# Patient Record
Sex: FEMALE | Race: OTHER | Employment: UNEMPLOYED | URBAN - METROPOLITAN AREA
[De-identification: names, ages, dates, MRNs, and addresses within clinical notes are randomized per-mention and may not be internally consistent; named-entity substitution may affect disease eponyms.]

---

## 2021-02-22 ENCOUNTER — VIRTUAL VISIT (OUTPATIENT)
Dept: FAMILY MEDICINE CLINIC | Age: 66
End: 2021-02-22

## 2021-02-22 ENCOUNTER — TELEPHONE (OUTPATIENT)
Dept: FAMILY MEDICINE CLINIC | Age: 66
End: 2021-02-22

## 2021-02-22 DIAGNOSIS — I10 ESSENTIAL HYPERTENSION: ICD-10-CM

## 2021-02-22 DIAGNOSIS — R07.89 CHEST WALL PAIN: Primary | ICD-10-CM

## 2021-02-22 DIAGNOSIS — E11.9 CONTROLLED TYPE 2 DIABETES MELLITUS WITHOUT COMPLICATION, WITHOUT LONG-TERM CURRENT USE OF INSULIN (HCC): ICD-10-CM

## 2021-02-22 PROCEDURE — 99441 PR PHYS/QHP TELEPHONE EVALUATION 5-10 MIN: CPT | Performed by: FAMILY MEDICINE

## 2021-02-22 RX ORDER — IRBESARTAN 300 MG/1
300 TABLET ORAL
COMMUNITY

## 2021-02-22 RX ORDER — METFORMIN HYDROCHLORIDE 850 MG/1
850 TABLET ORAL DAILY
COMMUNITY

## 2021-02-22 RX ORDER — HYDROCHLOROTHIAZIDE 25 MG/1
25 TABLET ORAL DAILY
COMMUNITY

## 2021-02-22 NOTE — PROGRESS NOTES
Minor Side (: 1955) is a 63 y.o. female, new patient, here for evaluation of the following chief complaint(s):   Fall and Diabetes (F/U)       ASSESSMENT/PLAN:  1. Chest wall pain  -     XR RIBS RT W PA CXR MIN 3 V; Future  Will get xray due to concern with rib fracture. Discussed conservative tx. 2. Controlled type 2 diabetes mellitus without complication, without long-term current use of insulin (HCC)  -     CBC W/O DIFF; Future  -     HEMOGLOBIN A1C WITH EAG; Future  -     LIPID PANEL; Future  -     METABOLIC PANEL, COMPREHENSIVE; Future  -     TSH 3RD GENERATION; Future  -     MICROALBUMIN, UR, RAND W/ MICROALB/CREAT RATIO; Future  Continue with current regimen and check baseline labs. 3. Essential hypertension  Continue with current medications and will have patient seen in office for BP check. Return in about 1 week (around 3/1/2021) for follow up for F2F in 1-2 weeks for check up. SUBJECTIVE/OBJECTIVE:  HPI  Fall:  Idelle Sails in shower. Slipped and fell hitting the Rt breast and rib. Now having pain with breathing that radiates back to Rt scapula. DM:  Patient is taking Glucophage regularly without any side effects. Last check was over 6 months ago. Does not check BS at home. HTN:  Patient is taking Avapro, HCTZ regularly without any side effects. Does not check BP at home. Review of Systems   Constitutional: Negative for chills and fever. Respiratory: Negative for cough and shortness of breath. No flowsheet data found. Physical Exam    On this date 21 I have spent 7 minutes reviewing previous notes, test results and face to face (virtual) with the patient discussing the diagnosis and importance of compliance with the treatment plan as well as documenting on the day of the visit. Minor Side is being evaluated by a Virtual Visit (phone visit) encounter to address concerns as mentioned above. A caregiver was present when appropriate.  Due to this being a TeleHealth encounter (During [facility] public health emergency), evaluation of the following organ systems was limited: Vitals/Constitutional/EENT/Resp/CV/GI//MS/Neuro/Skin/Heme-Lymph-Imm. Pursuant to the emergency declaration under the [address] [place] [address], [address] [place] [address] authority and the [employer] and [employer] Act, this Virtual Visit was conducted with patient's (and/or legal guardian's) consent, to reduce the patient's risk of exposure to COVID-19 and provide necessary medical care. The patient (and/or legal guardian) has also been advised to contact this office for worsening conditions or problems, and seek emergency medical treatment and/or call 911 if deemed necessary. Patient identification was verified at the start of the visit: YES    Services were provided through a phone synchronous discussion virtually to substitute for in-person clinic visit. Patient was located at home and provider was located in office or at home. An electronic signature was used to authenticate this note.   -- Jacqueline Ruiz MD

## 2021-02-22 NOTE — PROGRESS NOTES
check-out Note: follow up for F2F in 1-2 weeks for check up   Xrays and labs ordered. Don salgado or ASAP.  Labs can be done prior to OV visit if possible. Discharge nurse encounter completed via telephoned call. Name and  verified. AVS, prescription and pharmacy location reviewed . Walk in x-ray and care card process explained, per patient's request address on [facility] sent, no PHI included. Patient would also like care card application sent to her home address on file. Will send request to [facility] as I do not have access to printer/envelopes. Patient aware that registration will be calling to schedule f/up appointments. This has been fully explained to the patient, who indicates understanding and agrees with plan. No further questions at this time.  nAa Rasheed RN

## 2021-02-23 ENCOUNTER — HOSPITAL ENCOUNTER (OUTPATIENT)
Dept: GENERAL RADIOLOGY | Age: 66
Discharge: HOME | End: 2021-02-23

## 2021-02-23 DIAGNOSIS — R07.89 CHEST WALL PAIN: ICD-10-CM

## 2021-02-23 PROCEDURE — 71101 X-RAY EXAM UNILAT RIBS/CHEST: CPT

## 2021-02-23 NOTE — PROGRESS NOTES
No rib fractures noted. The low lung volumes occur when she is not taking deep breaths due to pain. Lungs look OK. It can be bruised rib or a pulled muscle. If pain is not improved in 2 weeks I would recommend redoing the xrays.

## 2021-03-01 ENCOUNTER — IMMUNIZATION (OUTPATIENT)
Dept: FAMILY MEDICINE CLINIC | Age: 66
End: 2021-03-01

## 2021-03-01 DIAGNOSIS — Z23 ENCOUNTER FOR IMMUNIZATION: Primary | ICD-10-CM

## 2021-03-01 PROCEDURE — 91300 COVID-19, MRNA, LNP-S, PF, 30MCG/0.3ML DOSE(PFIZER): CPT

## 2021-03-01 PROCEDURE — 0001A COVID-19, MRNA, LNP-S, PF, 30MCG/0.3ML DOSE(PFIZER): CPT

## 2021-03-10 NOTE — PROGRESS NOTES
Result note and recommendations from Dr. El Browne given to patient over the phone. Patient states that the pain has improved, before it was severed and now it is mild. Will notify provider of patient's improvement.  Barry Newman RN

## 2021-03-16 NOTE — PROGRESS NOTES
Tc to the pt. AMN Int # [id number]. The pt was asked how her ribs were feeling if they were improved. She stated they were much better. The pt was told the provider had recommended he have more xrays if she had not improved. The pt's Appts were reviewed with the pt, and the address of the provider appt on Thurs 03/18/21 at [id number] was sent via pt's request to her phone.  Sofia Vasquez RN]

## 2021-03-18 ENCOUNTER — TRANSCRIBE ORDER (OUTPATIENT)
Dept: SCHEDULING | Age: 66
End: 2021-03-18

## 2021-03-18 ENCOUNTER — OFFICE VISIT (OUTPATIENT)
Dept: FAMILY MEDICINE CLINIC | Age: 66
End: 2021-03-18

## 2021-03-18 ENCOUNTER — HOSPITAL ENCOUNTER (OUTPATIENT)
Dept: LAB | Age: 66
Discharge: HOME | End: 2021-03-18

## 2021-03-18 VITALS
BODY MASS INDEX: 31.49 KG/M2 | SYSTOLIC BLOOD PRESSURE: 190 MMHG | WEIGHT: 140 LBS | HEART RATE: 79 BPM | OXYGEN SATURATION: 96 % | HEIGHT: 56 IN | DIASTOLIC BLOOD PRESSURE: 84 MMHG | TEMPERATURE: 97.8 F

## 2021-03-18 DIAGNOSIS — R07.89 PRESSURE IN CHEST: ICD-10-CM

## 2021-03-18 DIAGNOSIS — I10 ESSENTIAL HYPERTENSION: ICD-10-CM

## 2021-03-18 DIAGNOSIS — R09.89 BILATERAL CAROTID BRUITS: ICD-10-CM

## 2021-03-18 DIAGNOSIS — E11.9 CONTROLLED TYPE 2 DIABETES MELLITUS WITHOUT COMPLICATION, WITHOUT LONG-TERM CURRENT USE OF INSULIN (HCC): ICD-10-CM

## 2021-03-18 DIAGNOSIS — E11.9 CONTROLLED TYPE 2 DIABETES MELLITUS WITHOUT COMPLICATION, WITHOUT LONG-TERM CURRENT USE OF INSULIN (HCC): Primary | ICD-10-CM

## 2021-03-18 LAB — GLUCOSE POC: NORMAL MG/DL

## 2021-03-18 PROCEDURE — 85027 COMPLETE CBC AUTOMATED: CPT

## 2021-03-18 PROCEDURE — 84443 ASSAY THYROID STIM HORMONE: CPT

## 2021-03-18 PROCEDURE — 80053 COMPREHEN METABOLIC PANEL: CPT

## 2021-03-18 PROCEDURE — 36415 COLL VENOUS BLD VENIPUNCTURE: CPT

## 2021-03-18 PROCEDURE — 83036 HEMOGLOBIN GLYCOSYLATED A1C: CPT

## 2021-03-18 PROCEDURE — 82043 UR ALBUMIN QUANTITATIVE: CPT

## 2021-03-18 PROCEDURE — 99214 OFFICE O/P EST MOD 30 MIN: CPT | Performed by: FAMILY MEDICINE

## 2021-03-18 PROCEDURE — 80061 LIPID PANEL: CPT

## 2021-03-18 PROCEDURE — 82962 GLUCOSE BLOOD TEST: CPT | Performed by: FAMILY MEDICINE

## 2021-03-18 RX ORDER — INSULIN ASPART 100 [IU]/ML
100 INJECTION, SOLUTION INTRAVENOUS; SUBCUTANEOUS
COMMUNITY

## 2021-03-18 RX ORDER — AMLODIPINE BESYLATE 5 MG/1
5 TABLET ORAL DAILY
Qty: 30 TAB | Refills: 2 | Status: SHIPPED | OUTPATIENT
Start: 2021-03-18 | End: 2021-04-16

## 2021-03-18 NOTE — PROGRESS NOTES
I have printed AVS and reviewed it with patient today. I reviewed with patient medications sent to pharmacy and gave Good RX card. I gave handout to patient that explains the Good RX process. Gave pt list of vision resources and explained that these address are for various locations of [facility], [employer] [facility] and [facility] locations that do eye exams. Also, I gave patient list of [employer] locations/addresses  and central scheduling phone number to call to schedule the ordered tests today. Patient instructed to ask for an  if they need one. Patient knows that we are waiting for correct order to be added to preference list of Dr. Darryl Gleason in order to schedule a stress echo test as the test ordered is incorrect. Family will call us tomorrow and ask for nurse or Adrienne to have help with scheduling of both Carotid dopplers and Stress Echo if they are not able to do so on their own. I reviewed patient instructions from AVS with patient as well. Maria A Garcia was  for this conversation.  Alejandro Velázquez RN

## 2021-03-18 NOTE — Clinical Note
Please make sure that patient got stress echo scheduled and also carotid dopplers.  Family is calling Friday to central scheduling to try to do this once the corrected stress echo order is placed by Dr. Ray Alfaor, RN

## 2021-03-18 NOTE — PROGRESS NOTES
Coordination of Care  1. Have you been to the ER, urgent care clinic since your last visit? Hospitalized since your last visit? No    2. Have you seen or consulted any other health care providers outside of the [address] [place] [address] since your last visit? Include any pap smears or colon screening. No    Does the patient need refills? NO    Learning Assessment Complete?  yes  Depression Screening complete in the past 12 months? yes     Results for orders placed or performed in visit on 03/18/21   AMB POC GLUCOSE BLOOD, BY GLUCOSE MONITORING DEVICE   Result Value Ref Range    Glucose POC nf-250 mg/dL

## 2021-03-18 NOTE — PROGRESS NOTES
Dusty Giles (: 1955) is a 63 y.o. female, established patient, here for evaluation of the following chief complaint(s):  Follow-up and Hypertension (with chest pain)       ASSESSMENT/PLAN:  1. Controlled type 2 diabetes mellitus without complication, without long-term current use of insulin ([address]  Fair control but I asked her to check her BS when she is having palpitations to make sure she is not having hypoglycemic spells. -     AMB POC GLUCOSE BLOOD, BY GLUCOSE MONITORING DEVICE  -     HEMOGLOBIN A1C WITH EAG; Future  -     LIPID PANEL; Future  -     METABOLIC PANEL, COMPREHENSIVE; Future  -     MICROALBUMIN, UR, RAND W/ MICROALB/CREAT RATIO; Future  2. Essential hypertension  Not controlled, add Norvasc. Stop Captopril since she is already taking ARB. -     CBC W/O DIFF; Future  -     TSH 3RD GENERATION; Future  3. Bilateral carotid bruits  Start ASA, check carotid duplex and lipids. -     DUPLEX CAROTID BILATERAL; Future  4. Pressure in chest  -     ECHO STRESS; Future  Will get stress ECHO. Will avoid BBlocker for now until I can confirm these are not hypoglycemic spells. Discussed that if she has an episode that does not resolve she needs to call 911 or go to ER. Patient and daughter understood and agreed with plan. -     STRESS ECHO ICLUD PERFORM ECG SEVERO WITH DR    Follow-up and Dispositions    · Return in about 1 week (around 3/25/2021) for follow up for F2F in 1-2 weeks for BP check. SUBJECTIVE:  HPI  Presents with daughter. DM:  Patient is taking Glucophage 850mg daily, Novolin N 25U AM, 10U HS, regularly without any side effects. Checks BS BID:  , . HTN:  Patient is taking Avapro, HCTZ regularly without any side effects. Has Captopril 25mg given to take PRN. Checks BP sporadically:  170-190/70-80s. Blood pressures have been higher for the past 2-4 weeks. No prior hx of AMI.   Has been having spells of palpitations and chest pressure, day or night, lasting 20-30 minutes. Worse when active. Woke her up at 3AM last night and lasted 20 minutes. Currently without any chest pain or pressure. Review of Systems   Constitutional: Negative for diaphoresis, fatigue, fever and unexpected weight change. Respiratory: Positive for chest tightness. Negative for cough and shortness of breath. Cardiovascular: Positive for palpitations. Negative for chest pain and leg swelling. Endocrine: Negative for polydipsia and polyuria. Genitourinary: Negative for difficulty urinating. Neurological: Negative for syncope, weakness, light-headedness and numbness. OBJECTIVE:  Blood pressure (!) 190/84, pulse 79, temperature 97.8 °F (36.6 °C), height 4' 8.5\" (1.435 m), weight 140 lb (63.5 kg), SpO2 96 %. Physical Exam  CONSTITUTIONAL:  Well developed. No apparent distress. PSYCHIATRIC: Oriented to time, place, person & situation. Appropriate mood and affect. NECK:  Normal inspection, normal palpation without any lymphadenopathy, masses, or thyromegaly  CARDIOVASCULAR:  Regular rate and rhythm. Normal S1, S2. No extra sounds. RESPIRATORY:  Normal effort. Normal ascultation without wheezing. VASCULAR:  Normal Carotid pulses with B bruits, Rt > Lt. Normal Radial, Posterior Tibialis pulses. EXTREMITIES:  No edema. Results for orders placed or performed in visit on 03/18/21   AMB POC GLUCOSE BLOOD, BY GLUCOSE MONITORING DEVICE   Result Value Ref Range    Glucose POC nf-250 mg/dL         An electronic signature was used to authenticate this note.   -- Refugio Fernandez MD

## 2021-03-19 ENCOUNTER — TELEPHONE (OUTPATIENT)
Dept: FAMILY MEDICINE CLINIC | Age: 66
End: 2021-03-19

## 2021-03-19 ENCOUNTER — TRANSCRIBE ORDER (OUTPATIENT)
Dept: SCHEDULING | Age: 66
End: 2021-03-19

## 2021-03-19 LAB
ALBUMIN SERPL-MCNC: 4 G/DL (ref 3.5–5)
ALBUMIN/GLOB SERPL: 1.1 {RATIO} (ref 1.1–2.2)
ALP SERPL-CCNC: 178 U/L (ref 45–117)
ALT SERPL-CCNC: 29 U/L (ref 12–78)
ANION GAP SERPL CALC-SCNC: 7 MMOL/L (ref 5–15)
AST SERPL-CCNC: 18 U/L (ref 15–37)
BILIRUB SERPL-MCNC: 0.4 MG/DL (ref 0.2–1)
BUN SERPL-MCNC: 38 MG/DL (ref 6–20)
BUN/CREAT SERPL: 28 (ref 12–20)
CALCIUM SERPL-MCNC: 9.6 MG/DL (ref 8.5–10.1)
CHLORIDE SERPL-SCNC: 105 MMOL/L (ref 97–108)
CHOLEST SERPL-MCNC: 221 MG/DL
CO2 SERPL-SCNC: 26 MMOL/L (ref 21–32)
CREAT SERPL-MCNC: 1.37 MG/DL (ref 0.55–1.02)
CREAT UR-MCNC: 23.3 MG/DL
ERYTHROCYTE [DISTWIDTH] IN BLOOD BY AUTOMATED COUNT: 13.5 % (ref 11.5–14.5)
GLOBULIN SER CALC-MCNC: 3.7 G/DL (ref 2–4)
GLUCOSE SERPL-MCNC: 206 MG/DL (ref 65–100)
HCT VFR BLD AUTO: 33.1 % (ref 35–47)
HDLC SERPL-MCNC: 83 MG/DL
HDLC SERPL: 2.7 {RATIO} (ref 0–5)
HGB BLD-MCNC: 10.5 G/DL (ref 11.5–16)
LDLC SERPL CALC-MCNC: 119.4 MG/DL (ref 0–100)
LIPID PROFILE,FLP: ABNORMAL
MCH RBC QN AUTO: 28.5 PG (ref 26–34)
MCHC RBC AUTO-ENTMCNC: 31.7 G/DL (ref 30–36.5)
MCV RBC AUTO: 89.9 FL (ref 80–99)
MICROALBUMIN UR-MCNC: 3.16 MG/DL
MICROALBUMIN/CREAT UR-RTO: 136 MG/G (ref 0–30)
NRBC # BLD: 0 K/UL (ref 0–0.01)
NRBC BLD-RTO: 0 PER 100 WBC
PLATELET # BLD AUTO: 225 K/UL (ref 150–400)
PMV BLD AUTO: 13 FL (ref 8.9–12.9)
POTASSIUM SERPL-SCNC: 4.2 MMOL/L (ref 3.5–5.1)
PROT SERPL-MCNC: 7.7 G/DL (ref 6.4–8.2)
RBC # BLD AUTO: 3.68 M/UL (ref 3.8–5.2)
SODIUM SERPL-SCNC: 138 MMOL/L (ref 136–145)
TRIGL SERPL-MCNC: 93 MG/DL (ref ?–150)
TSH SERPL DL<=0.05 MIU/L-ACNC: 1.28 UIU/ML (ref 0.36–3.74)
VLDLC SERPL CALC-MCNC: 18.6 MG/DL
WBC # BLD AUTO: 7 K/UL (ref 3.6–11)

## 2021-03-19 NOTE — PROGRESS NOTES
Tc to the Central scheduling today to request they put in the orders for the provider correctly. The Central scheduler stated they can place the order for the provider if the order is faxed over to them. The provider was sent this message.  Yesi Rojas RN

## 2021-03-19 NOTE — PROGRESS NOTES
Mild anemia. Will recheck this lab along with iron levels at her follow up appointment  Kidney function is a little slower than normal which can be from long term diabetes and high blood pressure.    Diabetes is a little high

## 2021-03-19 NOTE — TELEPHONE ENCOUNTER
TC to central scheduling again, and got their fax number. Fax # [phone]. The Central scheduler stated the order for the Stress Echo was put in as it was done at the clinic. They can put the order in and schedule it if the order was faxed to them as an order, the nurse could not fax the providers progress note to the [facility]. The order needed dx code, and states order is exactly what the provider wants and signed or electronically signed by the provider. The message was sent to the provider.  Junaid Humphreys RN

## 2021-03-19 NOTE — TELEPHONE ENCOUNTER
Tc to the pt's Dtr 2x, to assist them with scheduling the test for Carotid Artery dopplers, and Stress ECHO. No answer. A message was left for the pt to contact the [facility] office. The pt dtr was called again. The dtr answered the 2nd call. The Int # [id number]. The tests were both scheduled for the March 26th, 2021, at 8:30 am she is to arrive at 8:00 am, she is to be NPO after midnight, she is not to take her medication before the test. She is to bring a list of her medications with her to the appts, and her photo ID. The pt was instructed to ask about the Care Card, which is [facility]. She was encouraged to apply for the Care Card, and she could fill it out, but do not mail it until she received a bill. The bill will have her account number on it that should be put on the FA application and then delivered back to the Facility she had the tests done or mailed to the address on the application. The pt's dtr verbalized understanding of ll information and had no further questions.    Cullen Muñiz RN

## 2021-03-20 LAB
EST. AVERAGE GLUCOSE BLD GHB EST-MCNC: 180 MG/DL
HBA1C MFR BLD: 7.9 % (ref 4–5.6)

## 2021-03-22 ENCOUNTER — IMMUNIZATION (OUTPATIENT)
Dept: FAMILY MEDICINE CLINIC | Age: 66
End: 2021-03-22

## 2021-03-22 DIAGNOSIS — Z23 ENCOUNTER FOR IMMUNIZATION: Primary | ICD-10-CM

## 2021-03-22 PROCEDURE — 91300 COVID-19, MRNA, LNP-S, PF, 30MCG/0.3ML DOSE(PFIZER): CPT

## 2021-03-22 PROCEDURE — 0002A COVID-19, MRNA, LNP-S, PF, 30MCG/0.3ML DOSE(PFIZER): CPT

## 2021-03-24 ENCOUNTER — OFFICE VISIT (OUTPATIENT)
Dept: FAMILY MEDICINE CLINIC | Age: 66
End: 2021-03-24

## 2021-03-24 DIAGNOSIS — Z71.89 COUNSELING AND COORDINATION OF CARE: Primary | ICD-10-CM

## 2021-03-24 PROCEDURE — 99080 SPECIAL REPORTS OR FORMS: CPT | Performed by: FAMILY MEDICINE

## 2021-03-24 NOTE — PROGRESS NOTES
Patient has been screened for CC application. Patient has been scheduled for f2f appointment to complete process.

## 2021-03-26 ENCOUNTER — HOSPITAL ENCOUNTER (OUTPATIENT)
Dept: VASCULAR SURGERY | Age: 66
Discharge: HOME | End: 2021-03-26
Attending: FAMILY MEDICINE

## 2021-03-26 VITALS — DIASTOLIC BLOOD PRESSURE: 80 MMHG | SYSTOLIC BLOOD PRESSURE: 155 MMHG

## 2021-03-26 DIAGNOSIS — R09.89 BILATERAL CAROTID BRUITS: ICD-10-CM

## 2021-03-26 PROCEDURE — 93880 EXTRACRANIAL BILAT STUDY: CPT

## 2021-03-29 LAB
LEFT CCA DIST DIAS: 20.3 CM/S
LEFT CCA DIST SYS: 81.5 CM/S
LEFT CCA PROX DIAS: 19 CM/S
LEFT CCA PROX SYS: 67.2 CM/S
LEFT ECA DIAS: 11.2 CM/S
LEFT ECA SYS: 98.5 CM/S
LEFT ICA DIST DIAS: 32 CM/S
LEFT ICA DIST SYS: 95.8 CM/S
LEFT ICA MID DIAS: 30.7 CM/S
LEFT ICA MID SYS: 85.4 CM/S
LEFT ICA PROX DIAS: 24.2 CM/S
LEFT ICA PROX SYS: 88 CM/S
LEFT ICA/CCA SYS: 1.2
LEFT VERTEBRAL DIAS: 21.6 CM/S
LEFT VERTEBRAL SYS: 59.4 CM/S
RIGHT CCA DIST DIAS: 21.6 CM/S
RIGHT CCA DIST SYS: 79 CM/S
RIGHT CCA PROX DIAS: 20.3 CM/S
RIGHT CCA PROX SYS: 85.5 CM/S
RIGHT ECA DIAS: 13.8 CM/S
RIGHT ECA SYS: 84.2 CM/S
RIGHT ICA DIST DIAS: 26.8 CM/S
RIGHT ICA DIST SYS: 95.9 CM/S
RIGHT ICA MID DIAS: 19 CM/S
RIGHT ICA MID SYS: 72.4 CM/S
RIGHT ICA PROX DIAS: 16.4 CM/S
RIGHT ICA PROX SYS: 86.8 CM/S
RIGHT ICA/CCA SYS: 1.2
RIGHT VERTEBRAL DIAS: 15.1 CM/S
RIGHT VERTEBRAL SYS: 65.8 CM/S

## 2021-03-30 ENCOUNTER — VIRTUAL VISIT (OUTPATIENT)
Dept: FAMILY MEDICINE CLINIC | Age: 66
End: 2021-03-30

## 2021-03-30 DIAGNOSIS — E78.2 MIXED HYPERLIPIDEMIA: ICD-10-CM

## 2021-03-30 DIAGNOSIS — R09.89 BILATERAL CAROTID BRUITS: ICD-10-CM

## 2021-03-30 DIAGNOSIS — I10 ESSENTIAL HYPERTENSION: Primary | ICD-10-CM

## 2021-03-30 DIAGNOSIS — E11.9 CONTROLLED TYPE 2 DIABETES MELLITUS WITHOUT COMPLICATION, WITHOUT LONG-TERM CURRENT USE OF INSULIN (HCC): ICD-10-CM

## 2021-03-30 DIAGNOSIS — R07.89 PRESSURE IN CHEST: ICD-10-CM

## 2021-03-30 PROCEDURE — 99442 PR PHYS/QHP TELEPHONE EVALUATION 11-20 MIN: CPT | Performed by: FAMILY MEDICINE

## 2021-03-30 RX ORDER — ATORVASTATIN CALCIUM 40 MG/1
40 TABLET, FILM COATED ORAL
Qty: 30 TAB | Refills: 2 | Status: SHIPPED | OUTPATIENT
Start: 2021-03-30

## 2021-03-30 NOTE — PROGRESS NOTES
Jesse Anaya (: 1955) is a 63 y.o. female, established patient, here for evaluation of the following chief complaint(s):   Follow-up       ASSESSMENT/PLAN:  1. Essential hypertension  Improved but not controlled, will increase Norvasc to BID  2. Bilateral carotid bruits  Mild plaque buildup. Dopplers reviewed with patient. Will treat with ASA and statin. 3. Mixed hyperlipidemia  Start Lipitor  4. Pressure in chest  Stress test ordered. -     ECHO ADULT COMPLETE; Future  -     CARDIAC SPECT REST AND STRESS; Future    Return in about 2 weeks (around 2021) for follow up for F2F in 2 weeks for recheck of BP and chest pain. SUBJECTIVE/OBJECTIVE:  HPI   HTN:  Patient is taking Avapro, HCTZ, Norvasc regularly without any side effects. Checks BP:  150-168/80s. Chest pressure:  No prior hx of AMI. She continues to have intermittent spells of chest pressure. Non that are persisting or worsening. Worse when active. Currently without any chest pain or pressure. Seen for Stress ECHO and Cardiology recommended changing to [facility] ER and ECHO. Review of Systems   Constitutional: Negative for chills, diaphoresis, fatigue and fever. Respiratory: Positive for chest tightness. Negative for cough and shortness of breath. Cardiovascular: Positive for palpitations. Negative for leg swelling. Neurological: Negative for weakness, numbness and headaches. Patient-Reported Vitals 3/30/2021   Patient-Reported Pulse 74   Patient-Reported Systolic  018   Patient-Reported Diastolic 80     Physical Exam    On this date 2021 I have spent 18 minutes reviewing previous notes, test results and face to face (virtual) with the patient discussing the diagnosis and importance of compliance with the treatment plan as well as documenting on the day of the visit. Jesse Anaya is being evaluated by a Virtual Visit (phone visit) encounter to address concerns as mentioned above.   A caregiver was present when appropriate. Due to this being a TeleHealth encounter (During [facility]-37 public health emergency), evaluation of the following organ systems was limited: Vitals/Constitutional/EENT/Resp/CV/GI//MS/Neuro/Skin/Heme-Lymph-Imm. Pursuant to the emergency declaration under the [address] [place] [address], [address] [place] [address] and the [employer] and [employer] Act, this Virtual Visit was conducted with patient's (and/or legal guardian's) consent, to reduce the patient's risk of exposure to COVID-19 and provide necessary medical care. The patient (and/or legal guardian) has also been advised to contact this office for worsening conditions or problems, and seek emergency medical treatment and/or call 911 if deemed necessary. Patient identification was verified at the start of the visit: YES    Services were provided through a phone synchronous discussion virtually to substitute for in-person clinic visit. Patient was located at home and provider was located in office or at home. An electronic signature was used to authenticate this note.   -- Andi Mills MD

## 2021-03-30 NOTE — PROGRESS NOTES
glucose : f-111    Coordination of Care  1. Have you been to the ER, urgent care clinic since your last visit? Hospitalized since your last visit? No    2. Have you seen or consulted any other health care providers outside of the [address] [place] [address] since your last visit? Include any pap smears or colon screening. No    Does the patient need refills? NO    Learning Assessment Complete?  yes  Depression Screening complete in the past 12 months? yes

## 2021-03-30 NOTE — PROGRESS NOTES
With help of Jadon Diamond, information regarding appt @ [facility] @ 04/14/21, @ 10am for echo and stress test was relayed to pt. Also medication that was prescribed today.

## 2021-04-02 NOTE — PROGRESS NOTES
RN attempted call x2 to pt with the assistance of , Rip Amin. Message left for return call to [facility] main office.  Ariella Palumbo RN

## 2021-04-05 NOTE — PROGRESS NOTES
Tc to the pt Juanpablo Singh was the . The pt was given the providers message regarding her lab work. The pt was given the recommendations regarding healthy diet and exercise. The pt stated no one has ever given her instructions on how to eat a healthy diet. The pt was asked if she would like an appt with a nutritionist. The pt agreed that she would. The pt was told a message would be sent to the provider asking if she could have a referral to the nutritionist. If he agreed the registrar would contact her for an appt. The pt was told her anemia would be rechecked at her next appt. Her next appt was reviewed with the pt date, location, and time. The pt verbalized understanding of all info given to her.  Andrea Larkin RN

## 2021-04-12 ENCOUNTER — VIRTUAL VISIT (OUTPATIENT)
Dept: FAMILY MEDICINE CLINIC | Age: 66
End: 2021-04-12

## 2021-04-12 DIAGNOSIS — Z71.3 DIETARY COUNSELING AND SURVEILLANCE: Primary | ICD-10-CM

## 2021-04-12 PROCEDURE — 97802 MEDICAL NUTRITION INDIV IN: CPT

## 2021-04-12 NOTE — PROGRESS NOTES
Initial Appointment - Patient consents to virtual visit. [facility] -  #[id number]    DATE: 2021      REFERRING PROVIDER: Alexandra Patino MD  NAME: Radha Nova : 1955 AGE: 63 y.o. GENDER: female  REASON FOR VISIT: Diabetes Management & Elevated Cholesterol    ASSESSMENT: Ms. Viviane Bonilla believes she eats well and is interested in suggestions to better manage the diabetes. Past Medical Hx: Essential Hypertension, Diabetes Type 2    LABS:   Lab Results   Component Value Date/Time    Hemoglobin A1c 7.9 (H) 2021 07:21 AM       MEDICATIONS/SUPPLEMENTS:   Prior to Admission medications    Medication Sig Start Date End Date Taking? Authorizing Provider   atorvastatin (LIPITOR) 40 mg tablet Take 1 Tab by mouth nightly. 3/30/21   Julio Curtis MD   insulin aspart U-100 (NOVOLOG) 100 unit/mL injection 100 Units by SubCUTAneous route. Provider, Historical   amLODIPine (NORVASC) 5 mg tablet Take 1 Tab by mouth daily. If blood pressure is over 160/90 3/18/21   Julio Curtis MD   metFORMIN (GLUCOPHAGE) 850 mg tablet Take 850 mg by mouth daily. Provider, Historical   irbesartan (AVAPRO) 300 mg tablet Take 300 mg by mouth nightly. Provider, Historical   hydroCHLOROthiazide (HYDRODIURIL) 25 mg tablet Take 25 mg by mouth daily. Provider, Historical       FOOD ALLERGIES/INTOLERANCES: No known allergies.      ANTHROPOMETRICS:    Ht Readings from Last 1 Encounters:   21 4' 9\" (1.448 m)      Wt Readings from Last 1 Encounters:   21 140 lb (63.5 kg)      BMI: 30.30 Category: Obese Class I    Reported Wt Hx:  Wt Readings from Last 4 Encounters:   21 140 lb (63.5 kg)   21 140 lb (63.5 kg)     Estimated Nutritional Needs:   1,500 kcals/day ([address] Jeor- 1.2 Activity Factor)    Reported Diet Hx:     24 Hour Diet Recall  Breakfast   Sweet bread and egg with spinach or beans   Lunch  Rice and chicken OR  Chicken with a salad    Dinner  1 Tortilla with 1 egg and cheese   Snacks  Smoothie - Spinach, carrot and cucumber   Beverages  Coffee with Splenda or Stevia and Juice     Exercise/Physical Actvity: Walk for 30 minutes in the 2 times a week  Environmental/Social: Lives with her daughter and they both pick     NUTRITION INTERVENTION: Discussed how food impacts blood sugar levels, and reviewed which foods contain carbohydrates. Talked the importance of portion control when eating carbohydrates and pairing carbohydrates with a protein. We also talked about filling up on vegetables and the more colorful our vegetable choices the more nutrients we're getting. Also discussed how increased vegetables and whole grain as well as physical activity could be helpful for both diabetes and cholesterol management. PATIENT GOALS:  - Will begin walking 3 days a week for 30 minutes  - Limiting sweet bread (pan jackelyn) to a maximum of 3 times a week    Specific tips and techniques to facilitate compliance with above recommendations were provided and discussed. Pt was strongly encouraged to begin making necessary changes now. Will follow-up with Katey Bucio in 3 months to review progress towards goals.           Iveth Alston

## 2021-04-14 ENCOUNTER — HOSPITAL ENCOUNTER (OUTPATIENT)
Dept: NON INVASIVE DIAGNOSTICS | Age: 66
Discharge: HOME | End: 2021-04-14
Attending: FAMILY MEDICINE

## 2021-04-14 VITALS
HEIGHT: 59 IN | DIASTOLIC BLOOD PRESSURE: 72 MMHG | SYSTOLIC BLOOD PRESSURE: 183 MMHG | WEIGHT: 140 LBS | BODY MASS INDEX: 28.22 KG/M2

## 2021-04-14 VITALS
BODY MASS INDEX: 28.22 KG/M2 | SYSTOLIC BLOOD PRESSURE: 179 MMHG | HEIGHT: 59 IN | WEIGHT: 140 LBS | DIASTOLIC BLOOD PRESSURE: 70 MMHG

## 2021-04-14 DIAGNOSIS — R07.89 PRESSURE IN CHEST: ICD-10-CM

## 2021-04-14 LAB
ECHO AO ROOT DIAM: 2.99 CM
ECHO AV AREA PEAK VELOCITY: 1.92 CM2
ECHO AV AREA/BSA PEAK VELOCITY: 1.2 CM2/M2
ECHO AV PEAK GRADIENT: 8.95 MMHG
ECHO AV PEAK VELOCITY: 149.56 CM/S
ECHO LA AREA 4C: 19.33 CM2
ECHO LA MAJOR AXIS: 4.48 CM
ECHO LA MINOR AXIS: 2.83 CM
ECHO LA VOL 2C: 58.4 ML (ref 22–52)
ECHO LA VOL 4C: 58 ML (ref 22–52)
ECHO LA VOL BP: 63.93 ML (ref 22–52)
ECHO LA VOL/BSA BIPLANE: 40.34 ML/M2 (ref 16–28)
ECHO LA VOLUME INDEX A2C: 36.85 ML/M2 (ref 16–28)
ECHO LA VOLUME INDEX A4C: 36.6 ML/M2 (ref 16–28)
ECHO LV E' LATERAL VELOCITY: 5.35 CM/S
ECHO LV E' SEPTAL VELOCITY: 4.39 CM/S
ECHO LV INTERNAL DIMENSION DIASTOLIC: 4.46 CM (ref 3.9–5.3)
ECHO LV INTERNAL DIMENSION SYSTOLIC: 3.33 CM
ECHO LV IVSD: 1.18 CM (ref 0.6–0.9)
ECHO LV MASS 2D: 198.9 G (ref 67–162)
ECHO LV MASS INDEX 2D: 125.5 G/M2 (ref 43–95)
ECHO LV POSTERIOR WALL DIASTOLIC: 1.25 CM (ref 0.6–0.9)
ECHO LVOT DIAM: 1.94 CM
ECHO LVOT PEAK GRADIENT: 3.83 MMHG
ECHO LVOT PEAK VELOCITY: 97.8 CM/S
ECHO MV A VELOCITY: 99.44 CM/S
ECHO MV AREA PHT: 3.08 CM2
ECHO MV E DECELERATION TIME (DT): 246.25 MS
ECHO MV E VELOCITY: 71.14 CM/S
ECHO MV E/A RATIO: 0.72
ECHO MV E/E' LATERAL: 13.3
ECHO MV E/E' RATIO (AVERAGED): 14.75
ECHO MV E/E' SEPTAL: 16.21
ECHO MV PRESSURE HALF TIME (PHT): 71.41 MS
ECHO PV MAX VELOCITY: 94.39 CM/S
ECHO PV PEAK INSTANTANEOUS GRADIENT SYSTOLIC: 3.56 MMHG
ECHO RV INTERNAL DIMENSION: 3.58 CM
ECHO RV TAPSE: 1.44 CM (ref 1.5–2)
ECHO TV REGURGITANT MAX VELOCITY: 295.84 CM/S
ECHO TV REGURGITANT PEAK GRADIENT: 35.01 MMHG

## 2021-04-14 PROCEDURE — 93320 DOPPLER ECHO COMPLETE: CPT

## 2021-04-14 PROCEDURE — 93306 TTE W/DOPPLER COMPLETE: CPT

## 2021-04-14 NOTE — PROGRESS NOTES
Her ECHO (a more complete picture than the stress ECHO she had) shows normal heart function. There is mild enlargement of the heart which can be expected in someone with high blood pressure.   Her chest pressure can be from being out of shape but current heart tests appear normal.

## 2021-04-16 ENCOUNTER — OFFICE VISIT (OUTPATIENT)
Dept: FAMILY MEDICINE CLINIC | Age: 66
End: 2021-04-16

## 2021-04-16 ENCOUNTER — HOSPITAL ENCOUNTER (OUTPATIENT)
Dept: LAB | Age: 66
Discharge: HOME | End: 2021-04-16

## 2021-04-16 VITALS
BODY MASS INDEX: 29.93 KG/M2 | DIASTOLIC BLOOD PRESSURE: 68 MMHG | OXYGEN SATURATION: 99 % | HEIGHT: 58 IN | HEART RATE: 90 BPM | TEMPERATURE: 97.8 F | WEIGHT: 142.6 LBS | SYSTOLIC BLOOD PRESSURE: 179 MMHG

## 2021-04-16 DIAGNOSIS — I10 ESSENTIAL HYPERTENSION: ICD-10-CM

## 2021-04-16 DIAGNOSIS — E78.2 MIXED HYPERLIPIDEMIA: ICD-10-CM

## 2021-04-16 DIAGNOSIS — D64.9 ANEMIA, UNSPECIFIED TYPE: ICD-10-CM

## 2021-04-16 DIAGNOSIS — E11.9 CONTROLLED TYPE 2 DIABETES MELLITUS WITHOUT COMPLICATION, WITHOUT LONG-TERM CURRENT USE OF INSULIN (HCC): Primary | ICD-10-CM

## 2021-04-16 DIAGNOSIS — R05.9 COUGH: ICD-10-CM

## 2021-04-16 LAB
BILIRUB UR QL STRIP: NEGATIVE
GLUCOSE POC: NORMAL MG/DL
GLUCOSE UR-MCNC: NORMAL MG/DL
HGB BLD-MCNC: 9.4 G/DL
KETONES P FAST UR STRIP-MCNC: NEGATIVE MG/DL
PH UR STRIP: 6 [PH] (ref 4.6–8)
PROT UR QL STRIP: NEGATIVE
SP GR UR STRIP: 1.02 (ref 1–1.03)
UA UROBILINOGEN AMB POC: NORMAL (ref 0.2–1)
URINALYSIS CLARITY POC: CLEAR
URINALYSIS COLOR POC: YELLOW
URINE BLOOD POC: NEGATIVE
URINE LEUKOCYTES POC: NEGATIVE
URINE NITRITES POC: NEGATIVE

## 2021-04-16 PROCEDURE — 82607 VITAMIN B-12: CPT

## 2021-04-16 PROCEDURE — 85018 HEMOGLOBIN: CPT | Performed by: FAMILY MEDICINE

## 2021-04-16 PROCEDURE — 82728 ASSAY OF FERRITIN: CPT

## 2021-04-16 PROCEDURE — 81003 URINALYSIS AUTO W/O SCOPE: CPT | Performed by: FAMILY MEDICINE

## 2021-04-16 PROCEDURE — 82962 GLUCOSE BLOOD TEST: CPT | Performed by: FAMILY MEDICINE

## 2021-04-16 PROCEDURE — 83540 ASSAY OF IRON: CPT

## 2021-04-16 PROCEDURE — 85025 COMPLETE CBC W/AUTO DIFF WBC: CPT

## 2021-04-16 PROCEDURE — 85652 RBC SED RATE AUTOMATED: CPT

## 2021-04-16 PROCEDURE — 99214 OFFICE O/P EST MOD 30 MIN: CPT | Performed by: FAMILY MEDICINE

## 2021-04-16 PROCEDURE — 3051F HG A1C>EQUAL 7.0%<8.0%: CPT | Performed by: FAMILY MEDICINE

## 2021-04-16 PROCEDURE — 80048 BASIC METABOLIC PNL TOTAL CA: CPT

## 2021-04-16 RX ORDER — AMLODIPINE BESYLATE 5 MG/1
5 TABLET ORAL 2 TIMES DAILY
Qty: 60 TAB | Refills: 3 | Status: SHIPPED | OUTPATIENT
Start: 2021-04-16

## 2021-04-16 NOTE — PROGRESS NOTES
Avs discussed with Kaila Crystal by Discharge Nurse Mak Milligan LPN. Discussed medication prescribed today. Info for CXR given to pt. Patient verbalized understanding and has no further questions.  AVS printed and given to patient Mak Milligan LPN

## 2021-04-16 NOTE — PROGRESS NOTES
Sarthak Galeana (: 1955) is a 63 y.o. female, established patient, here for evaluation of the following chief complaint(s):  Follow Up Chronic Condition (From VV 3/30/21- HTN-Diabetes-Iron) and Labs (U/s )       ASSESSMENT/PLAN:  1. Controlled type 2 diabetes mellitus without complication, without long-term current use of insulin (HCC)  Controlled on home readings, continue current regimen. Will recheck Cr since she showed mild CKD on last check. -     AMB POC GLUCOSE BLOOD, BY GLUCOSE MONITORING DEVICE  -     AMB POC HEMOGLOBIN (HGB)  -     AMB POC URINALYSIS DIP STICK AUTO W/O MICRO  2. Essential hypertension  Improved on home readings. Not ideal.  Continue with current regimen and consider adding low dose Coreg.  -     METABOLIC PANEL, BASIC; Future  3. Mixed hyperlipidemia  Tolerating Lipitor. Started for DM and mild carotid disease. 4. Anemia, unspecified type  Uncertain source. Patient denies any bleeding. Will recheck today. -     CBC WITH AUTOMATED DIFF; Future  -     IRON PROFILE; Future  -     FERRITIN; Future  -     VITAMIN B12; Future  -     SED RATE (ESR); Future  5. Cough  For 1 month. Family states this is likely from allergies but will get CXR. -     XR CHEST PA LAT; Future    Follow-up and Dispositions    · Return in about 6 weeks (around 2021) for follow up for F2F in 6 weeks for BP and anemia check. SUBJECTIVE:  HPI  Presents with daughter who assists in her care. HTN:  Patient is taking Avapro, HCTZ, Norvasc regularly without any side effects. Checks BP:  140-150/70s. Chest pressure:  No prior hx of AMI. She continues to have intermittent spells of chest pressure. Non that are persisting or worsening. Worse when active. Currently without any chest pain or pressure. Stress ECHO and ECHO were unrevealing for acute process. .  DM:  Patient is taking Glucophage 850mg daily, Novolin N 25U AM, 10U HS, regularly without any side effects.   Checks BS BID: -130s. Patient forgot her insulin today. HLD: Patient has started Lipitor without any side effects. Review of Systems   Constitutional: Negative for diaphoresis, fatigue, fever and unexpected weight change. Respiratory: Positive for cough and chest tightness. Negative for shortness of breath. Cardiovascular: Positive for leg swelling. Negative for chest pain and palpitations. Gastrointestinal: Negative for abdominal pain and blood in stool. Endocrine: Negative for polydipsia and polyuria. Genitourinary: Negative for hematuria and vaginal bleeding. Neurological: Negative for syncope, weakness, light-headedness and headaches. Hematological: Does not bruise/bleed easily. OBJECTIVE:  Blood pressure (!) 179/68, pulse 90, temperature 97.8 °F (36.6 °C), temperature source Temporal, height 4' 9.72\" (1.466 m), weight 142 lb 9.6 oz (64.7 kg), SpO2 99 %. Physical Exam  CONSTITUTIONAL:  Well developed. No apparent distress. PSYCHIATRIC: Oriented to time, place, person & situation. Appropriate mood and affect. CARDIOVASCULAR:  Regular rate and rhythm. Normal S1, S2. No extra sounds. RESPIRATORY:  Normal effort. Normal ascultation without wheezing. VASCULAR:  Normal Carotid pulses without bruits. Normal Posterior Tibialis pulses. EXTREMITIES:  No edema.     Results for orders placed or performed in visit on 04/16/21   AMB POC GLUCOSE BLOOD, BY GLUCOSE MONITORING DEVICE   Result Value Ref Range    Glucose POC nf   444 MG/DL   AMB POC HEMOGLOBIN (HGB)   Result Value Ref Range    Hemoglobin (POC) 9.4 G/DL   AMB POC URINALYSIS DIP STICK AUTO W/O MICRO   Result Value Ref Range    Color (UA POC) Yellow     Clarity (UA POC) Clear     Glucose (UA POC) 3+ Negative    Bilirubin (UA POC) Negative Negative    Ketones (UA POC) Negative Negative    Specific gravity (UA POC) 1.020 1.001 - 1.035    Blood (UA POC) Negative Negative    pH (UA POC) 6.0 4.6 - 8.0    Protein (UA POC) Negative Negative Urobilinogen (UA POC) 0.2 mg/dL 0.2 - 1    Nitrites (UA POC) Negative Negative    Leukocyte esterase (UA POC) Negative Negative         An electronic signature was used to authenticate this note.   -- Guicho Ann MD

## 2021-04-16 NOTE — PROGRESS NOTES
Coordination of Care  1. Have you been to the ER, urgent care clinic since your last visit? Hospitalized since your last visit? No    2. Have you seen or consulted any other health care providers outside of the [address] [place] [address] since your last visit? Include any pap smears or colon screening. No    Does the patient need refills? NO    Learning Assessment Complete?  yes  Depression Screening complete in the past 12 months? yes     Results for orders placed or performed in visit on 04/16/21   AMB POC GLUCOSE BLOOD, BY GLUCOSE MONITORING DEVICE   Result Value Ref Range    Glucose POC nf   444 MG/DL   AMB POC HEMOGLOBIN (HGB)   Result Value Ref Range    Hemoglobin (POC) 9.4 G/DL   AMB POC URINALYSIS DIP STICK AUTO W/O MICRO   Result Value Ref Range    Color (UA POC) Yellow     Clarity (UA POC) Clear     Glucose (UA POC) 3+ Negative    Bilirubin (UA POC) Negative Negative    Ketones (UA POC) Negative Negative    Specific gravity (UA POC) 1.020 1.001 - 1.035    Blood (UA POC) Negative Negative    pH (UA POC) 6.0 4.6 - 8.0    Protein (UA POC) Negative Negative    Urobilinogen (UA POC) 0.2 mg/dL 0.2 - 1    Nitrites (UA POC) Negative Negative    Leukocyte esterase (UA POC) Negative Negative

## 2021-04-17 LAB
ANION GAP SERPL CALC-SCNC: 6 MMOL/L (ref 5–15)
BASOPHILS # BLD: 0.1 K/UL (ref 0–0.1)
BASOPHILS NFR BLD: 1 % (ref 0–1)
BUN SERPL-MCNC: 29 MG/DL (ref 6–20)
BUN/CREAT SERPL: 23 (ref 12–20)
CALCIUM SERPL-MCNC: 9.6 MG/DL (ref 8.5–10.1)
CHLORIDE SERPL-SCNC: 106 MMOL/L (ref 97–108)
CO2 SERPL-SCNC: 26 MMOL/L (ref 21–32)
CREAT SERPL-MCNC: 1.25 MG/DL (ref 0.55–1.02)
DIFFERENTIAL METHOD BLD: ABNORMAL
EOSINOPHIL # BLD: 0.4 K/UL (ref 0–0.4)
EOSINOPHIL NFR BLD: 5 % (ref 0–7)
ERYTHROCYTE [DISTWIDTH] IN BLOOD BY AUTOMATED COUNT: 12.7 % (ref 11.5–14.5)
ERYTHROCYTE [SEDIMENTATION RATE] IN BLOOD: 35 MM/HR (ref 0–30)
FERRITIN SERPL-MCNC: 54 NG/ML (ref 8–252)
GLUCOSE SERPL-MCNC: 356 MG/DL (ref 65–100)
HCT VFR BLD AUTO: 31.9 % (ref 35–47)
HGB BLD-MCNC: 10 G/DL (ref 11.5–16)
IMM GRANULOCYTES # BLD AUTO: 0 K/UL (ref 0–0.04)
IMM GRANULOCYTES NFR BLD AUTO: 0 % (ref 0–0.5)
IRON SATN MFR SERPL: 19 % (ref 20–50)
IRON SERPL-MCNC: 68 UG/DL (ref 35–150)
LYMPHOCYTES # BLD: 2.1 K/UL (ref 0.8–3.5)
LYMPHOCYTES NFR BLD: 29 % (ref 12–49)
MCH RBC QN AUTO: 28.7 PG (ref 26–34)
MCHC RBC AUTO-ENTMCNC: 31.3 G/DL (ref 30–36.5)
MCV RBC AUTO: 91.7 FL (ref 80–99)
MONOCYTES # BLD: 0.7 K/UL (ref 0–1)
MONOCYTES NFR BLD: 10 % (ref 5–13)
NEUTS SEG # BLD: 3.9 K/UL (ref 1.8–8)
NEUTS SEG NFR BLD: 54 % (ref 32–75)
NRBC # BLD: 0 K/UL (ref 0–0.01)
NRBC BLD-RTO: 0 PER 100 WBC
PLATELET # BLD AUTO: 203 K/UL (ref 150–400)
POTASSIUM SERPL-SCNC: 4.4 MMOL/L (ref 3.5–5.1)
RBC # BLD AUTO: 3.48 M/UL (ref 3.8–5.2)
SODIUM SERPL-SCNC: 138 MMOL/L (ref 136–145)
TIBC SERPL-MCNC: 357 UG/DL (ref 250–450)
VIT B12 SERPL-MCNC: 932 PG/ML (ref 193–986)
WBC # BLD AUTO: 7.2 K/UL (ref 3.6–11)

## 2021-04-19 NOTE — PROGRESS NOTES
Her iron levels are OK but saturation is a little low. I would start an OTC iron supplement daily: Ferrous Sulfate 325mg once daily. Will recheck labs at next visit. Kidney function is better. Stay hydrated. Blood sugar is high but patient had not taken her insulin that day. Other labs are stable.

## 2021-04-23 NOTE — PROGRESS NOTES
Tc to the pt AMN Int # [id number]. The pt was given the ECHO results. She verbalized understanding, but had a question she did not know what the provider meant by chest pressure. She stated she did not have this. It was explained to the pt that at her last visit with the provider she had complained of chest tightness. This is what he was referring to as chest pressure. She verbalized understanding.  Pretty Ramirez RN

## 2021-05-11 NOTE — PROGRESS NOTES
I called pt today and  Manuela with [facility] assisted with this phone call today. as  and gave message from provider.  Alivia Fuentes RN

## 2021-08-31 ENCOUNTER — TELEPHONE (OUTPATIENT)
Dept: FAMILY MEDICINE CLINIC | Age: 66
End: 2021-08-31

## 2021-12-10 ENCOUNTER — IMMUNIZATION (OUTPATIENT)
Dept: FAMILY MEDICINE CLINIC | Age: 66
End: 2021-12-10

## 2021-12-10 DIAGNOSIS — Z23 ENCOUNTER FOR IMMUNIZATION: Primary | ICD-10-CM

## 2021-12-10 PROCEDURE — 91300 COVID-19, MRNA, LNP-S, PF, 30MCG/0.3ML DOSE(PFIZER): CPT

## 2021-12-10 PROCEDURE — 0004A COVID-19, MRNA, LNP-S, PF, 30MCG/0.3ML DOSE(PFIZER): CPT

## 2022-03-18 PROBLEM — E11.9 CONTROLLED TYPE 2 DIABETES MELLITUS WITHOUT COMPLICATION, WITHOUT LONG-TERM CURRENT USE OF INSULIN (HCC): Status: ACTIVE | Noted: 2021-02-22

## 2022-03-19 PROBLEM — I10 ESSENTIAL HYPERTENSION: Status: ACTIVE | Noted: 2021-02-22

## 2023-05-15 RX ORDER — INSULIN ASPART 100 [IU]/ML
100 INJECTION, SOLUTION INTRAVENOUS; SUBCUTANEOUS
COMMUNITY

## 2023-05-15 RX ORDER — HYDROCHLOROTHIAZIDE 25 MG/1
25 TABLET ORAL DAILY
COMMUNITY

## 2023-05-15 RX ORDER — AMLODIPINE BESYLATE 5 MG/1
5 TABLET ORAL 2 TIMES DAILY
COMMUNITY
Start: 2021-04-16

## 2023-05-15 RX ORDER — IRBESARTAN 300 MG/1
300 TABLET ORAL NIGHTLY
COMMUNITY

## 2023-05-15 RX ORDER — ATORVASTATIN CALCIUM 40 MG/1
40 TABLET, FILM COATED ORAL
COMMUNITY
Start: 2021-03-30

## 2024-07-12 ENCOUNTER — HOSPITAL ENCOUNTER (OUTPATIENT)
Facility: HOSPITAL | Age: 69
Discharge: HOME | End: 2024-07-15

## 2024-07-12 DIAGNOSIS — Z12.31 VISIT FOR SCREENING MAMMOGRAM: ICD-10-CM

## 2024-07-12 PROCEDURE — 77067 SCR MAMMO BI INCL CAD: CPT

## 2024-07-24 ENCOUNTER — HOSPITAL ENCOUNTER (OUTPATIENT)
Facility: HOSPITAL | Age: 69
Setting detail: SPECIMEN
Discharge: HOME | End: 2024-07-27

## 2024-07-24 PROCEDURE — 36415 COLL VENOUS BLD VENIPUNCTURE: CPT

## 2024-07-24 PROCEDURE — 86696 HERPES SIMPLEX TYPE 2 TEST: CPT

## 2024-07-24 PROCEDURE — 86695 HERPES SIMPLEX TYPE 1 TEST: CPT

## 2024-07-25 ENCOUNTER — HOSPITAL ENCOUNTER (OUTPATIENT)
Facility: HOSPITAL | Age: 69
Setting detail: SPECIMEN
Discharge: HOME | End: 2024-07-28

## 2024-07-27 LAB
HSV1 IGG SER IA-ACNC: 54.9 INDEX (ref 0–0.9)
HSV2 IGG SER IA-ACNC: 23.3 INDEX (ref 0–0.9)